# Patient Record
Sex: FEMALE | Race: WHITE | URBAN - METROPOLITAN AREA
[De-identification: names, ages, dates, MRNs, and addresses within clinical notes are randomized per-mention and may not be internally consistent; named-entity substitution may affect disease eponyms.]

---

## 2017-12-18 ENCOUNTER — OFFICE VISIT (OUTPATIENT)
Dept: ORTHOPEDICS | Facility: CLINIC | Age: 27
End: 2017-12-18
Payer: COMMERCIAL

## 2017-12-18 VITALS
DIASTOLIC BLOOD PRESSURE: 74 MMHG | WEIGHT: 111.8 LBS | BODY MASS INDEX: 17.55 KG/M2 | SYSTOLIC BLOOD PRESSURE: 104 MMHG | HEIGHT: 67 IN

## 2017-12-18 DIAGNOSIS — M51.360 DISCOGENIC SYNDROME, LUMBAR: Primary | ICD-10-CM

## 2017-12-18 PROCEDURE — 99203 OFFICE O/P NEW LOW 30 MIN: CPT | Performed by: FAMILY MEDICINE

## 2017-12-18 ASSESSMENT — ENCOUNTER SYMPTOMS
FOCAL WEAKNESS: 0
SENSORY CHANGE: 0
BACK PAIN: 1

## 2017-12-18 NOTE — LETTER
12/18/2017         RE: Delmis Jose  [address] [facility] [address]        Dear Colleague,    Thank you for referring your patient, Delmis Jose, to the [facility]. Please see a copy of my visit note below.    [facility]   Clinic Visit s Dec 18, 2017    PCP: Marielle Lemus      Delmis is a 27 year old female who is seen as self referral for   Chief Complaint   Patient presents with     Hip left       Injury: Patient describes injury as she was lifting a keg. She has pain a couple days later     right-hand dominant    Location of Pain: left SI area, some pain through the glute area  Duration of Pain: 1 years  Rating of Pain at worst: 9/10 (initially)  Rating of Pain Currently: 6/10  Pain is worse with: walking, trunk flexion, prolonged sitting, squatting  Pain is better with: rolling out/self massage, chiropractic, ice, and heat  Treatment so far consists of: chiropractic (relief - insurance has stopped covering treatments)   Associated symptoms: radiating pain down to the knee, anterior,   Prior History of related problems: none    Dramatic pain initially, unable to lift leg to get in to car.    Unable to weight lift doing squats for over a year.     Social History: works doing dog training, very active       Patient Active Problem List    Diagnosis Date Noted     Discogenic syndrome, lumbar 12/18/2017     Priority: Medium     Health Care Home 12/07/2016     Priority: Medium     State Tier Level:  Tier 1  Status:  n/a  Care Coordinator:     See Letters for [facility] Care Plan         ACP (advance care planning) 12/07/2016     Priority: Medium     Advance Care Planning 12/7/2016: ACP Review of Chart / Resources Provided:  Reviewed chart for advance care plan.  Delmis Jose has no plan or code status on file. Discussed available resources and patient declined to receive them. Confirmed code status reflects current choices pending further ACP  "discussions.  Confirmed/documented legally designated decision makers.  Added by Myriam Duran             No family history on file.    Social History     Social History     Marital status: Single     Spouse name: N/A     Number of children: N/A     Years of education: N/A     Social History Main Topics     Smoking status: Never Smoker     Smokeless tobacco: Never Used     Alcohol use 3.0 oz/week     5 Standard drinks or equivalent per week     Drug use: No     Sexual activity: Yes     Birth control/ protection: None     Other Topics Concern     Not on file     Social History Narrative     No narrative on file       Past Surgical History:   Procedure Laterality Date     NO HISTORY OF SURGERY               Review of Systems   Musculoskeletal: Positive for back pain.   Neurological: Negative for sensory change and focal weakness.   All other systems reviewed and are negative.        Physical Exam  /74  Ht 5' 6.5\" (1.689 m)  Wt 111 lb 12.8 oz (50.7 kg)  BMI 17.77 kg/m2  Constitutional:well-developed, well-nourished, and in no distress.   Cardiovascular: Intact distal pulses.    Neurological: alert. Gait Normal:   Gait, station, stance, and balance appear normal for age  Skin: Skin is warm and dry.   Psychiatric: Mood and affect normal.   Respiratory: unlabored, speaks in full sentences  Lymph: no LAD, no lymphangitis      Back Exam   Sensation: Normal.  Gait: Normal.    Tenderness   The patient is experiencing tenderness in the sacroiliac tenderness.    Range of Motion   Flexion:                    Abnormal  Extension:                Normal  Lateral Bend Left:    Normal  Lateral Bend Right:  Abnormal  Rotation Right:         Normal  Rotation Left:           Normal  SLR    Right: Negative  Left:    Negative    Comments:  LEFT si area tenderness to palpation  only        ASSESSMENT/PLAN    ICD-10-CM    1. Discogenic syndrome, lumbar M51.26 [facility] HAND, AND CHIROPRACTIC REFERRAL     Disc injury likely. No " response to extensive chiro tx.  Declines pain rx or oral steroids.  May call for MRI if desired after checking on costs.  Start PHYSICAL THERAPY, recheck as needed.     Again, thank you for allowing me to participate in the care of your patient.        Sincerely,        Keegan Griffin MD

## 2017-12-18 NOTE — MR AVS SNAPSHOT
After Visit Summary   12/18/2017    Delmis Jose    MRN: [id number]           Patient Information     Date Of Birth          1990        Visit Information        Provider Department      12/18/2017 2:40 PM Keegan Griffin MD [facility]        Today's Diagnoses     Discogenic syndrome, lumbar    -  1       Follow-ups after your visit        Additional Services     [facility], HAND, AND CHIROPRACTIC REFERRAL       **This order will print in the [facility] Scheduling Office**    Physical Therapy, Hand Therapy and Chiropractic Care are available through:    *[facility]  *[facility]  *[facility]    Call one number to schedule at any of the above locations: [phone].    Your provider has referred you to: Physical Therapy at [facility] or [facility]    Indication/Reason for Referral: Discogenic syndrome, lumbar    Onset of Illness:   Therapy Orders: Evaluate and Treat  Special Programs: None  Special Request: None    Sisi Laird      Additional Comments for the Therapist or Chiropractor:     Please be aware that coverage of these services is subject to the terms and limitations of your health insurance plan.  Call member services at your health plan with any benefit or coverage questions.      Please bring the following to your appointment:    *Your personal calendar for scheduling future appointments  *Comfortable clothing                  Your next 10 appointments already scheduled     Dec 20, 2017  2:00 PM CST   (Arrive by 1:45 PM)   [facility] Extremity with William Mora PT   [facility] ([facility]  )    [address] [place] [id number]   [phone]            Dec 27, 2017  2:00 PM CST   [facility] Extremity with William Mora PT   [facility] ([facility]  )    [address] [place] [address]   [phone]              Who to contact     If you have questions or need follow  "up information about today's clinic visit or your schedule please contact [facility] directly at [phone].  Normal or non-critical lab and imaging results will be communicated to you by TSCAhart, letter or phone within 4 business days after the clinic has received the results. If you do not hear from us within 7 days, please contact the clinic through TSCAhart or phone. If you have a critical or abnormal lab result, we will notify you by phone as soon as possible.  Submit refill requests through XillianTV or call your pharmacy and they will forward the refill request to us. Please allow 3 business days for your refill to be completed.          Additional Information About Your Visit        TSCAharAmberWave Information     XillianTV lets you send messages to your doctor, view your test results, renew your prescriptions, schedule appointments and more. To sign up, go to [email or web address] . Click on \"Log in\" on the left side of the screen, which will take you to the Welcome page. Then click on \"Sign up Now\" on the right side of the page.     You will be asked to enter the access code listed below, as well as some personal information. Please follow the directions to create your username and password.     Your access code is: [email or web address]  Expires: 3/18/2018  3:25 PM     Your access code will  in 90 days. If you need help or a new code, please call your [place] clinic or [phone].        Care EveryWhere ID     This is your Care EveryWhere ID. This could be used by other organizations to access your [place] medical records  [id number]        Your Vitals Were     Height BMI (Body Mass Index)                5' 6.5\" (1.689 m) 17.77 kg/m2           Blood Pressure from Last 3 Encounters:   17 104/74   16 112/80    Weight from Last 3 Encounters:   17 111 lb 12.8 oz (50.7 kg)   16 111 lb 12.8 oz (50.7 kg)              We Performed the Following     [facility] PT, HAND, AND " CHIROPRACTIC REFERRAL        Primary Care Provider Office Phone # Fax #    [facility] [phone] [address]  [facility] [address]        Equal Access to Services     REMEDIOS DOWNS : Hadii aad ku hadnicolaso Soomaali, waaxda luqadaha, qaybta kaalmada adeegyada, waxesvin idiin hayarlynn adedalton major lasuman todd. So [facility] [phone].    ATENCIÓN: Si habla español, tiene a correa disposición servicios gratuitos de asistencia lingüística. Llame al [phone].    We comply with applicable federal civil rights laws and [place] laws. We do not discriminate on the basis of race, color, national origin, age, disability, sex, sexual orientation, or gender identity.            Thank you!     Thank you for choosing [facility]  for your care. Our goal is always to provide you with excellent care. Hearing back from our patients is one way we can continue to improve our services. Please take a few minutes to complete the written survey that you may receive in the mail after your visit with us. Thank you!             Your Updated Medication List - Protect others around you: Learn how to safely use, store and throw away your medicines at www.disposemymeds.org.      Notice  As of 12/18/2017  3:25 PM    You have not been prescribed any medications.

## 2017-12-18 NOTE — PROGRESS NOTES
[facility]   Clinic Visit s Dec 18, 2017    PCP: Marielle Lemus      Delmis is a 27 year old female who is seen as self referral for   Chief Complaint   Patient presents with     Hip left       Injury: Patient describes injury as she was lifting a keg. She has pain a couple days later     right-hand dominant    Location of Pain: left SI area, some pain through the glute area  Duration of Pain: 1 years  Rating of Pain at worst: 9/10 (initially)  Rating of Pain Currently: 6/10  Pain is worse with: walking, trunk flexion, prolonged sitting, squatting  Pain is better with: rolling out/self massage, chiropractic, ice, and heat  Treatment so far consists of: chiropractic (relief - insurance has stopped covering treatments)   Associated symptoms: radiating pain down to the knee, anterior,   Prior History of related problems: none    Dramatic pain initially, unable to lift leg to get in to car.    Unable to weight lift doing squats for over a year.     Social History: works doing dog training, very active       Patient Active Problem List    Diagnosis Date Noted     Discogenic syndrome, lumbar 12/18/2017     Priority: Medium     Health Care Home 12/07/2016     Priority: Medium     State Tier Level:  Tier 1  Status:  n/a  Care Coordinator:     See Letters for [facility] Care Plan         ACP (advance care planning) 12/07/2016     Priority: Medium     Advance Care Planning 12/7/2016: ACP Review of Chart / Resources Provided:  Reviewed chart for advance care plan.  Delmis Jose has no plan or code status on file. Discussed available resources and patient declined to receive them. Confirmed code status reflects current choices pending further ACP discussions.  Confirmed/documented legally designated decision makers.  Added by Myriam Duran             No family history on file.    Social History     Social History     Marital status: Single     Spouse name: N/A     Number of children: N/A  "    Years of education: N/A     Social History Main Topics     Smoking status: Never Smoker     Smokeless tobacco: Never Used     Alcohol use 3.0 oz/week     5 Standard drinks or equivalent per week     Drug use: No     Sexual activity: Yes     Birth control/ protection: None     Other Topics Concern     Not on file     Social History Narrative     No narrative on file       Past Surgical History:   Procedure Laterality Date     NO HISTORY OF SURGERY               Review of Systems   Musculoskeletal: Positive for back pain.   Neurological: Negative for sensory change and focal weakness.   All other systems reviewed and are negative.        Physical Exam  /74  Ht 5' 6.5\" (1.689 m)  Wt 111 lb 12.8 oz (50.7 kg)  BMI 17.77 kg/m2  Constitutional:well-developed, well-nourished, and in no distress.   Cardiovascular: Intact distal pulses.    Neurological: alert. Gait Normal:   Gait, station, stance, and balance appear normal for age  Skin: Skin is warm and dry.   Psychiatric: Mood and affect normal.   Respiratory: unlabored, speaks in full sentences  Lymph: no LAD, no lymphangitis      Back Exam   Sensation: Normal.  Gait: Normal.    Tenderness   The patient is experiencing tenderness in the sacroiliac tenderness.    Range of Motion   Flexion:                    Abnormal  Extension:                Normal  Lateral Bend Left:    Normal  Lateral Bend Right:  Abnormal  Rotation Right:         Normal  Rotation Left:           Normal  SLR    Right: Negative  Left:    Negative    Comments:  LEFT si area tenderness to palpation  only        ASSESSMENT/PLAN    ICD-10-CM    1. Discogenic syndrome, lumbar M51.26 [facility], HAND, AND CHIROPRACTIC REFERRAL     Disc injury likely. No response to extensive chiro tx.  Declines pain rx or oral steroids.  May call for MRI if desired after checking on costs.  Start PHYSICAL THERAPY, recheck as needed.   "

## 2017-12-21 ENCOUNTER — THERAPY VISIT (OUTPATIENT)
Dept: PHYSICAL THERAPY | Facility: CLINIC | Age: 27
End: 2017-12-21
Payer: COMMERCIAL

## 2017-12-21 DIAGNOSIS — M51.360 DISCOGENIC SYNDROME, LUMBAR: Primary | ICD-10-CM

## 2017-12-21 PROCEDURE — 97161 PT EVAL LOW COMPLEX 20 MIN: CPT | Mod: GP | Performed by: PHYSICAL THERAPIST

## 2017-12-21 PROCEDURE — 97110 THERAPEUTIC EXERCISES: CPT | Mod: GP | Performed by: PHYSICAL THERAPIST

## 2017-12-21 ASSESSMENT — ACTIVITIES OF DAILY LIVING (ADL)
HOS_ADL_HIGHEST_POTENTIAL_SCORE: 64
SITTING_FOR_15_MINUTES: MODERATE DIFFICULTY
GETTING_INTO_AND_OUT_OF_AN_AVERAGE_CAR: EXTREME DIFFICULTY
HOS_ADL_SCORE(%): 40.62
DEEP_SQUATTING: NO DIFFICULTY AT ALL
HOW_WOULD_YOU_RATE_YOUR_CURRENT_LEVEL_OF_FUNCTION_DURING_YOUR_USUAL_ACTIVITIES_OF_DAILY_LIVING_FROM_0_TO_100_WITH_100_BEING_YOUR_LEVEL_OF_FUNCTION_PRIOR_TO_YOUR_HIP_PROBLEM_AND_0_BEING_THE_INABILITY_TO_PERFORM_ANY_OF_YOUR_USUAL_DAILY_ACTIVITIES?: 80
TWISTING/PIVOTING_ON_INVOLVED_LEG: EXTREME DIFFICULTY
GOING_UP_1_FLIGHT_OF_STAIRS: EXTREME DIFFICULTY
STEPPING_UP_AND_DOWN_CURBS: UNABLE TO DO
WALKING_APPROXIMATELY_10_MINUTES: MODERATE DIFFICULTY
GETTING_INTO_AND_OUT_OF_A_BATHTUB: MODERATE DIFFICULTY
HEAVY_WORK: EXTREME DIFFICULTY
WALKING_UP_STEEP_HILLS: EXTREME DIFFICULTY
WALKING_DOWN_STEEP_HILLS: EXTREME DIFFICULTY
PUTTING_ON_SOCKS_AND_SHOES: EXTREME DIFFICULTY
HOS_ADL_ITEM_SCORE_TOTAL: 26
LIGHT_TO_MODERATE_WORK: SLIGHT DIFFICULTY
GOING_DOWN_1_FLIGHT_OF_STAIRS: EXTREME DIFFICULTY
STANDING_FOR_15_MINUTES: SLIGHT DIFFICULTY
WALKING_INITIALLY: MODERATE DIFFICULTY
WALKING_15_MINUTES_OR_GREATER: MODERATE DIFFICULTY
HOS_ADL_COUNT: 16
ROLLING_OVER_IN_BED: EXTREME DIFFICULTY

## 2017-12-21 NOTE — PROGRESS NOTES
[facility] Evaluation  Subjective:    Patient is a 27 year old female presenting with rehab back hpi.   Delmis Jose is a 27 year old female with a lumbar condition.  Condition occurred with:  Lifting.  Condition occurred: in the community.  This is a chronic condition  Pt injured back one year ago lifting a keg. Pt has had constant pain since. Pt referred by MD for physical therapy on 12-18-17.    Patient reports pain:  Lumbar spine left.  Radiates to:  Gluteals left.  Pain is described as aching and is constant and reported as 9/10.  Associated symptoms:  Loss of strength and loss of motion/stiffness. Pain is worse in the A.M. and worse in the P.M..  Symptoms are exacerbated by bending, lifting and carrying and relieved by rest.  Since onset symptoms are unchanged.  Special testing: none.  Previous treatment includes chiropractic.  There was moderate improvement following previous treatment.    Pertinent medical history includes:  None.  Medical allergies: no.  Other surgeries include:  No.  Current medications:  None as reported by the patient.  Current occupation is .  Patient is working in normal job without restrictions.  Primary job tasks include:  Prolonged standing, lifting, repetitive tasks and driving.    Barriers include:  None as reported by the patient.    Red flags:  None as reported by the patient.                        Objective:          Flexibility/Screens:       Lower Extremity:  Decreased left lower extremity flexibility:Piriformis and Hamstrings                 Lumbar/SI Evaluation  ROM:    AROM Lumbar:   Flexion:          70% pain  Ext:                    80% decreases pain   Side Bend:        Left:  90%    Right:  80% pulls  Rotation:           Left:     Right:   Side Glide:        Left:     Right:         Strength: weak abdominals and pelvic stabilizers  Lumbar Myotomes:  normal            Lumbar DTR's:  normal        Lumbar Dermtomes:   normal                Neural Tension/Mobility:      Left side:SLR  negative.     Right side:   SLR  negative.   Lumbar Palpation:  Palpation (lumbar): point tenderness L4, l5 spinous processes.                                                         General     ROS    Assessment/Plan:      Patient is a 27 year old female with lumbar complaints.    Patient has the following significant findings with corresponding treatment plan.                Diagnosis 1:  Lumbar disc protrusion  Pain -  hot/cold therapy, self management, education, directional preference exercise and home program  Decreased ROM/flexibility - therapeutic exercise, therapeutic activity and home program  Decreased strength - therapeutic exercise, therapeutic activities and home program    Therapy Evaluation Codes:   1) History comprised of:   Personal factors that impact the plan of care:      Time since onset of symptoms.    Comorbidity factors that impact the plan of care are:      Weakness.     Medications impacting care: None.  2) Examination of Body Systems comprised of:   Body structures and functions that impact the plan of care:      Hip and Lumbar spine.   Activity limitations that impact the plan of care are:      Bathing, Dressing, Lifting, Sitting, Standing and Walking.  3) Clinical presentation characteristics are:   Stable/Uncomplicated.  4) Decision-Making    Low complexity using standardized patient assessment instrument and/or measureable assessment of functional outcome.  Cumulative Therapy Evaluation is: Low complexity.    Previous and current functional limitations:  (See Goal Flow Sheet for this information)    Short term and Long term goals: (See Goal Flow Sheet for this information)     Communication ability:  Patient appears to be able to clearly communicate and understand verbal and written communication and follow directions correctly.  Treatment Explanation - The following has been discussed with the patient:   RX  ordered/plan of care  Anticipated outcomes  Possible risks and side effects  This patient would benefit from PT intervention to resume normal activities.   Rehab potential is good.    Frequency:  1 X week, once daily  Duration:  for 6 weeks  Discharge Plan:  Achieve all LTG.  Independent in home treatment program.  Reach maximal therapeutic benefit.    Please refer to the daily flowsheet for treatment today, total treatment time and time spent performing 1:1 timed codes.

## 2018-01-07 ENCOUNTER — HEALTH MAINTENANCE LETTER (OUTPATIENT)
Age: 28
End: 2018-01-07

## 2018-01-09 PROBLEM — M51.360 DISCOGENIC SYNDROME, LUMBAR: Status: RESOLVED | Noted: 2017-12-18 | Resolved: 2018-01-09
